# Patient Record
Sex: MALE | Race: WHITE | URBAN - METROPOLITAN AREA
[De-identification: names, ages, dates, MRNs, and addresses within clinical notes are randomized per-mention and may not be internally consistent; named-entity substitution may affect disease eponyms.]

---

## 2020-07-23 ENCOUNTER — APPOINTMENT (RX ONLY)
Dept: URBAN - METROPOLITAN AREA CLINIC 53 | Facility: CLINIC | Age: 50
Setting detail: DERMATOLOGY
End: 2020-07-23

## 2020-07-23 DIAGNOSIS — L82.1 OTHER SEBORRHEIC KERATOSIS: ICD-10-CM

## 2020-07-23 DIAGNOSIS — L81.4 OTHER MELANIN HYPERPIGMENTATION: ICD-10-CM

## 2020-07-23 PROCEDURE — 99202 OFFICE O/P NEW SF 15 MIN: CPT

## 2020-07-23 PROCEDURE — ? ADDITIONAL NOTES

## 2020-07-23 PROCEDURE — ? FULL BODY SKIN EXAM - DECLINED

## 2020-07-23 PROCEDURE — ? COUNSELING

## 2020-07-23 ASSESSMENT — LOCATION DETAILED DESCRIPTION DERM
LOCATION DETAILED: LEFT PROXIMAL ULNAR DORSAL FOREARM
LOCATION DETAILED: LEFT DISTAL POSTERIOR UPPER ARM
LOCATION DETAILED: RIGHT DISTAL POSTERIOR UPPER ARM
LOCATION DETAILED: LEFT PROXIMAL DORSAL FOREARM
LOCATION DETAILED: RIGHT DISTAL RADIAL DORSAL FOREARM
LOCATION DETAILED: RIGHT PROXIMAL DORSAL FOREARM

## 2020-07-23 ASSESSMENT — LOCATION SIMPLE DESCRIPTION DERM
LOCATION SIMPLE: LEFT UPPER ARM
LOCATION SIMPLE: LEFT FOREARM
LOCATION SIMPLE: RIGHT UPPER ARM
LOCATION SIMPLE: RIGHT FOREARM

## 2020-07-23 ASSESSMENT — LOCATION ZONE DERM: LOCATION ZONE: ARM

## 2022-01-12 ENCOUNTER — APPOINTMENT (RX ONLY)
Dept: URBAN - METROPOLITAN AREA CLINIC 53 | Facility: CLINIC | Age: 52
Setting detail: DERMATOLOGY
End: 2022-01-12

## 2022-01-12 DIAGNOSIS — Z02.9 ENCOUNTER FOR ADMINISTRATIVE EXAMINATIONS, UNSPECIFIED: ICD-10-CM

## 2022-01-18 ENCOUNTER — APPOINTMENT (RX ONLY)
Dept: URBAN - METROPOLITAN AREA CLINIC 53 | Facility: CLINIC | Age: 52
Setting detail: DERMATOLOGY
End: 2022-01-18

## 2022-01-18 DIAGNOSIS — L72.8 OTHER FOLLICULAR CYSTS OF THE SKIN AND SUBCUTANEOUS TISSUE: ICD-10-CM | Status: STABLE

## 2022-01-18 DIAGNOSIS — D22 MELANOCYTIC NEVI: ICD-10-CM | Status: STABLE

## 2022-01-18 DIAGNOSIS — L57.8 OTHER SKIN CHANGES DUE TO CHRONIC EXPOSURE TO NONIONIZING RADIATION: ICD-10-CM | Status: INADEQUATELY CONTROLLED

## 2022-01-18 DIAGNOSIS — L57.0 ACTINIC KERATOSIS: ICD-10-CM

## 2022-01-18 PROBLEM — D22.39 MELANOCYTIC NEVI OF OTHER PARTS OF FACE: Status: ACTIVE | Noted: 2022-01-18

## 2022-01-18 PROCEDURE — ? OBSERVATION AND MEASURE

## 2022-01-18 PROCEDURE — 17003 DESTRUCT PREMALG LES 2-14: CPT

## 2022-01-18 PROCEDURE — ? COUNSELING

## 2022-01-18 PROCEDURE — 17000 DESTRUCT PREMALG LESION: CPT

## 2022-01-18 PROCEDURE — ? RECOMMENDATIONS

## 2022-01-18 PROCEDURE — ? LIQUID NITROGEN

## 2022-01-18 PROCEDURE — ? ADDITIONAL NOTES

## 2022-01-18 PROCEDURE — ? IN-HOUSE DISPENSING PHARMACY

## 2022-01-18 PROCEDURE — 99213 OFFICE O/P EST LOW 20 MIN: CPT | Mod: 25

## 2022-01-18 ASSESSMENT — LOCATION SIMPLE DESCRIPTION DERM
LOCATION SIMPLE: CHIN
LOCATION SIMPLE: LEFT ZYGOMA
LOCATION SIMPLE: LEFT CHEEK
LOCATION SIMPLE: LEFT FOREHEAD
LOCATION SIMPLE: RIGHT EAR

## 2022-01-18 ASSESSMENT — LOCATION ZONE DERM
LOCATION ZONE: EAR
LOCATION ZONE: FACE

## 2022-01-18 ASSESSMENT — LOCATION DETAILED DESCRIPTION DERM
LOCATION DETAILED: LEFT CENTRAL ZYGOMA
LOCATION DETAILED: LEFT MEDIAL MALAR CHEEK
LOCATION DETAILED: RIGHT CHIN
LOCATION DETAILED: RIGHT SUPERIOR HELIX
LOCATION DETAILED: LEFT INFERIOR FOREHEAD

## 2022-01-18 NOTE — PROCEDURE: IN-HOUSE DISPENSING PHARMACY
Product 29 Price/Unit (In Dollars): 0
Product 1 Amount/Unit (Numbers Only): 67
Product 26 Unit Type: mg
Product 6 Unit Type: tube(s)
Name Of Product 4: Biocorneum Scar Cream
Product 2 Price/Unit (In Dollars): 28
Product 4 Application Directions: Apply BID
Detail Level: Zone
Send Charges To Patient Encounter: Yes
Product 4 Unit Type: bottle(s)
Product 2 Amount/Unit (Numbers Only): 1
Product 5 Price/Unit (In Dollars): 121.80
Name Of Product 3: Proscriptix serum
Product 1 Price/Unit (In Dollars): 38.50
Product 5 Amount/Unit (Numbers Only): 30
Product 3 Application Directions: Apply qd
Product 6 Price/Unit (In Dollars): 65
Product 3 Unit Type: jar(s)
Product 4 Price/Unit (In Dollars): 29.95
Product 6 Application Directions: Apply qhs
Product 4 Refills: 2
Name Of Product 2: Proscriptix conditioner
Name Of Product 5: R/E c serum
Product 3 Price/Unit (In Dollars): 45
Name Of Product 1: Elta md 40
Product 5 Unit Type: ml
Name Of Product 6: Tretinoin 0.05%

## 2022-01-18 NOTE — PROCEDURE: LIQUID NITROGEN
Number Of Freeze-Thaw Cycles: 1 freeze-thaw cycle
Render Post-Care Instructions In Note?: yes
Consent: The patient's consent was obtained including but not limited to risks of crusting, scabbing, blistering, scarring, darker or lighter pigmentary change, recurrence, incomplete removal and infection.
Detail Level: Detailed
Render Note In Bullet Format When Appropriate: No
Duration Of Freeze Thaw-Cycle (Seconds): 3
Post-Care Instructions: I reviewed with the patient in detail post-care instructions. Patient is to wear sunprotection, and avoid picking at any of the treated lesions. Pt may apply Vaseline to crusted or scabbing areas.

## 2022-01-18 NOTE — PROCEDURE: RECOMMENDATIONS
Recommendations (Free Text): Pt will monitor, if any changes pt will schedule  for a biopsy
Recommendation Preamble: Sunscreen daily with SPF 30+
Detail Level: Zone
Render Risk Assessment In Note?: no
Recommendations (Free Text): Pt will monitor, and call if any changes

## 2022-05-31 ENCOUNTER — APPOINTMENT (RX ONLY)
Dept: URBAN - METROPOLITAN AREA CLINIC 53 | Facility: CLINIC | Age: 52
Setting detail: DERMATOLOGY
End: 2022-05-31

## 2022-05-31 DIAGNOSIS — L72.8 OTHER FOLLICULAR CYSTS OF THE SKIN AND SUBCUTANEOUS TISSUE: ICD-10-CM | Status: STABLE

## 2022-05-31 DIAGNOSIS — L57.0 ACTINIC KERATOSIS: ICD-10-CM

## 2022-05-31 DIAGNOSIS — L57.8 OTHER SKIN CHANGES DUE TO CHRONIC EXPOSURE TO NONIONIZING RADIATION: ICD-10-CM | Status: STABLE

## 2022-05-31 PROCEDURE — 17003 DESTRUCT PREMALG LES 2-14: CPT

## 2022-05-31 PROCEDURE — 99213 OFFICE O/P EST LOW 20 MIN: CPT | Mod: 25

## 2022-05-31 PROCEDURE — ? COUNSELING

## 2022-05-31 PROCEDURE — ? LIQUID NITROGEN

## 2022-05-31 PROCEDURE — 17000 DESTRUCT PREMALG LESION: CPT

## 2022-05-31 ASSESSMENT — LOCATION SIMPLE DESCRIPTION DERM
LOCATION SIMPLE: RIGHT TEMPLE
LOCATION SIMPLE: RIGHT FOREARM
LOCATION SIMPLE: RIGHT FOREHEAD
LOCATION SIMPLE: LEFT CHEEK

## 2022-05-31 ASSESSMENT — LOCATION DETAILED DESCRIPTION DERM
LOCATION DETAILED: LEFT MEDIAL MALAR CHEEK
LOCATION DETAILED: RIGHT SUPERIOR LATERAL FOREHEAD
LOCATION DETAILED: RIGHT DISTAL DORSAL FOREARM
LOCATION DETAILED: RIGHT CENTRAL TEMPLE

## 2022-05-31 ASSESSMENT — LOCATION ZONE DERM
LOCATION ZONE: FACE
LOCATION ZONE: ARM

## 2022-05-31 NOTE — PROCEDURE: LIQUID NITROGEN
Post-Care Instructions: I reviewed with the patient in detail post-care instructions. Patient is to wear sunprotection, and avoid picking at any of the treated lesions. Pt may apply Vaseline to crusted or scabbing areas.
Render Post-Care Instructions In Note?: yes
Detail Level: Detailed
Number Of Freeze-Thaw Cycles: 1 freeze-thaw cycle
Render Note In Bullet Format When Appropriate: No
Duration Of Freeze Thaw-Cycle (Seconds): 3
Consent: The patient's consent was obtained including but not limited to risks of crusting, scabbing, blistering, scarring, darker or lighter pigmentary change, recurrence, incomplete removal and infection.

## 2022-07-19 ENCOUNTER — APPOINTMENT (RX ONLY)
Dept: URBAN - METROPOLITAN AREA CLINIC 53 | Facility: CLINIC | Age: 52
Setting detail: DERMATOLOGY
End: 2022-07-19

## 2022-07-19 DIAGNOSIS — L57.0 ACTINIC KERATOSIS: ICD-10-CM | Status: RESOLVED

## 2022-07-19 PROCEDURE — ? COUNSELING

## 2022-07-19 PROCEDURE — ? ADDITIONAL NOTES

## 2022-07-19 PROCEDURE — 99213 OFFICE O/P EST LOW 20 MIN: CPT

## 2022-07-19 ASSESSMENT — LOCATION ZONE DERM
LOCATION ZONE: FACE
LOCATION ZONE: ARM

## 2022-07-19 ASSESSMENT — LOCATION SIMPLE DESCRIPTION DERM
LOCATION SIMPLE: RIGHT FOREARM
LOCATION SIMPLE: RIGHT TEMPLE

## 2022-07-19 ASSESSMENT — LOCATION DETAILED DESCRIPTION DERM
LOCATION DETAILED: RIGHT CENTRAL TEMPLE
LOCATION DETAILED: RIGHT DISTAL DORSAL FOREARM